# Patient Record
Sex: FEMALE | URBAN - METROPOLITAN AREA
[De-identification: names, ages, dates, MRNs, and addresses within clinical notes are randomized per-mention and may not be internally consistent; named-entity substitution may affect disease eponyms.]

---

## 2023-02-06 ENCOUNTER — OFFICE VISIT (OUTPATIENT)
Dept: URGENT CARE | Age: 28
End: 2023-02-06

## 2023-02-06 VITALS
SYSTOLIC BLOOD PRESSURE: 101 MMHG | DIASTOLIC BLOOD PRESSURE: 70 MMHG | TEMPERATURE: 99.9 F | BODY MASS INDEX: 22.31 KG/M2 | HEIGHT: 62 IN | RESPIRATION RATE: 17 BRPM | WEIGHT: 121.25 LBS | OXYGEN SATURATION: 97 % | HEART RATE: 110 BPM

## 2023-02-06 DIAGNOSIS — H66.91 ACUTE RIGHT OTITIS MEDIA: Primary | ICD-10-CM

## 2023-02-06 DIAGNOSIS — J02.0 ACUTE STREPTOCOCCAL PHARYNGITIS: ICD-10-CM

## 2023-02-06 LAB — STREPTOCOCCUS A RNA: POSITIVE

## 2023-02-06 RX ORDER — DEXTROMETHORPHAN POLISTIREX 30 MG/5ML
60 SUSPENSION ORAL 2 TIMES DAILY PRN
COMMUNITY
Start: 2023-02-06 | End: 2023-02-16

## 2023-02-06 RX ORDER — AMOXICILLIN 875 MG/1
875 TABLET, COATED ORAL 2 TIMES DAILY
Qty: 20 TABLET | Refills: 0 | Status: SHIPPED | OUTPATIENT
Start: 2023-02-06 | End: 2023-02-16

## 2023-02-06 ASSESSMENT — ENCOUNTER SYMPTOMS
SHORTNESS OF BREATH: 0
COUGH: 1
DIARRHEA: 0
VOMITING: 0
RHINORRHEA: 1
SORE THROAT: 1

## 2023-02-06 NOTE — PATIENT INSTRUCTIONS
Rapid strep positive in clinic today  Results reviewed with patient  Complete antibiotics as prescribed. Follow up in 7 days if symptoms persist or if symptoms worsen.

## 2023-02-06 NOTE — PROGRESS NOTES
Martin Laird (:  1995) is a 28 y.o. female,New patient, here for evaluation of the following chief complaint(s):  Pharyngitis and Cough (For about a month painful)      ASSESSMENT/PLAN:    ICD-10-CM    1. Acute right otitis media  H66.91 amoxicillin (AMOXIL) 875 MG tablet     dextromethorphan (DELSYM) 30 MG/5ML extended release liquid      2. Acute streptococcal pharyngitis  J02.0 POCT Rapid Strep A DNA (Alere i)          Rapid strep positive in clinic today  Results reviewed with patient  Complete antibiotics as prescribed. Follow up in 7 days if symptoms persist or if symptoms worsen. SUBJECTIVE/OBJECTIVE:    History provided by:  Patient   used: No    HPI:   28 y.o. female who is breastfeeding presents with symptoms of cough and sore throat ongoing for over one month. Feels feverish but has not checked temperature. Has taken Flonase and Robitussin for symptoms with mild relief. Vitals:    23 1205 23 1220   BP: 101/70    Pulse: (!) 117 (!) 110   Resp: 17    Temp: 99.9 °F (37.7 °C)    SpO2: 97%    Weight: 121 lb 4.1 oz (55 kg)    Height: 5' 2\" (1.575 m)        Review of Systems   Constitutional:  Positive for chills, diaphoresis and fatigue. HENT:  Positive for ear pain, rhinorrhea and sore throat. Negative for congestion. Respiratory:  Positive for cough (productive of white mucous). Negative for shortness of breath. Gastrointestinal:  Negative for diarrhea and vomiting. Skin:  Negative for rash. Neurological:  Positive for headaches. All other systems reviewed and are negative. Physical Exam  Vitals reviewed. Constitutional:       Appearance: Normal appearance. HENT:      Head: Normocephalic. Right Ear: Ear canal and external ear normal. Tympanic membrane is erythematous.       Left Ear: Tympanic membrane, ear canal and external ear normal.      Nose: Nose normal.      Mouth/Throat:      Mouth: Mucous membranes are moist. Pharynx: Uvula midline. Oropharyngeal exudate and posterior oropharyngeal erythema present. No uvula swelling. Tonsils: Tonsillar exudate present. No tonsillar abscesses. Eyes:      Pupils: Pupils are equal, round, and reactive to light. Cardiovascular:      Rate and Rhythm: Regular rhythm. Tachycardia present. Heart sounds: Normal heart sounds. Pulmonary:      Effort: Pulmonary effort is normal.   Musculoskeletal:      Cervical back: Normal range of motion. Neurological:      Mental Status: She is alert. Psychiatric:         Mood and Affect: Mood normal.         Behavior: Behavior normal.         An electronic signature was used to authenticate this note.     --Dasha Garces, NESTOR - CNP

## 2025-04-30 ENCOUNTER — OFFICE VISIT (OUTPATIENT)
Dept: PRIMARY CARE CLINIC | Age: 30
End: 2025-04-30
Payer: COMMERCIAL

## 2025-04-30 VITALS
DIASTOLIC BLOOD PRESSURE: 50 MMHG | RESPIRATION RATE: 16 BRPM | BODY MASS INDEX: 22.41 KG/M2 | TEMPERATURE: 96.8 F | HEART RATE: 70 BPM | OXYGEN SATURATION: 100 % | WEIGHT: 121.8 LBS | HEIGHT: 62 IN | SYSTOLIC BLOOD PRESSURE: 70 MMHG

## 2025-04-30 DIAGNOSIS — Z76.89 ENCOUNTER TO ESTABLISH CARE: Primary | ICD-10-CM

## 2025-04-30 DIAGNOSIS — D64.9 ANEMIA, UNSPECIFIED TYPE: ICD-10-CM

## 2025-04-30 DIAGNOSIS — R10.33 UMBILICAL PAIN: ICD-10-CM

## 2025-04-30 DIAGNOSIS — K21.9 GASTROESOPHAGEAL REFLUX DISEASE, UNSPECIFIED WHETHER ESOPHAGITIS PRESENT: ICD-10-CM

## 2025-04-30 DIAGNOSIS — Z97.5 IUD (INTRAUTERINE DEVICE) IN PLACE: ICD-10-CM

## 2025-04-30 DIAGNOSIS — R42 DIZZINESS: ICD-10-CM

## 2025-04-30 DIAGNOSIS — I95.0 IDIOPATHIC HYPOTENSION: ICD-10-CM

## 2025-04-30 DIAGNOSIS — Z90.49 S/P APPENDECTOMY: ICD-10-CM

## 2025-04-30 PROCEDURE — 99203 OFFICE O/P NEW LOW 30 MIN: CPT | Performed by: FAMILY MEDICINE

## 2025-04-30 PROCEDURE — 1036F TOBACCO NON-USER: CPT | Performed by: FAMILY MEDICINE

## 2025-04-30 PROCEDURE — G8420 CALC BMI NORM PARAMETERS: HCPCS | Performed by: FAMILY MEDICINE

## 2025-04-30 PROCEDURE — G8427 DOCREV CUR MEDS BY ELIG CLIN: HCPCS | Performed by: FAMILY MEDICINE

## 2025-04-30 RX ORDER — PANTOPRAZOLE SODIUM 20 MG/1
20 TABLET, DELAYED RELEASE ORAL
Qty: 30 TABLET | Refills: 2 | Status: SHIPPED | OUTPATIENT
Start: 2025-04-30

## 2025-04-30 SDOH — ECONOMIC STABILITY: FOOD INSECURITY: WITHIN THE PAST 12 MONTHS, THE FOOD YOU BOUGHT JUST DIDN'T LAST AND YOU DIDN'T HAVE MONEY TO GET MORE.: NEVER TRUE

## 2025-04-30 SDOH — ECONOMIC STABILITY: FOOD INSECURITY: WITHIN THE PAST 12 MONTHS, YOU WORRIED THAT YOUR FOOD WOULD RUN OUT BEFORE YOU GOT MONEY TO BUY MORE.: NEVER TRUE

## 2025-04-30 ASSESSMENT — PATIENT HEALTH QUESTIONNAIRE - PHQ9
2. FEELING DOWN, DEPRESSED OR HOPELESS: NOT AT ALL
SUM OF ALL RESPONSES TO PHQ QUESTIONS 1-9: 0
1. LITTLE INTEREST OR PLEASURE IN DOING THINGS: NOT AT ALL

## 2025-04-30 NOTE — PROGRESS NOTES
Shabnam Sanches (:  1995) is a 29 y.o. female,New patient, here for evaluation of the following chief complaint(s):  Abdominal Pain, Establish Care, Digestion problem, and Gastroesophageal Reflux      ASSESSMENT/PLAN:  1. Encounter to establish care  -     CBC with Auto Differential; Future  -     Comprehensive Metabolic Panel; Future  2. Idiopathic hypotension  Assessment & Plan:     3. Anemia, unspecified type  -     CBC with Auto Differential; Future  4. Umbilical pain  -     pantoprazole (PROTONIX) 20 MG tablet; Take 1 tablet by mouth every morning (before breakfast), Disp-30 tablet, R-2Normal  -     US ABDOMEN LIMITED; Future  -     Comprehensive Metabolic Panel; Future  5. Gastroesophageal reflux disease, unspecified whether esophagitis present  -     pantoprazole (PROTONIX) 20 MG tablet; Take 1 tablet by mouth every morning (before breakfast), Disp-30 tablet, R-2Normal  -     US ABDOMEN LIMITED; Future  -     Comprehensive Metabolic Panel; Future  6. Dizziness  7. IUD (intrauterine device) in place  Assessment & Plan:     8. S/P appendectomy  Assessment & Plan:         Return if symptoms worsen or fail to improve.    SUBJECTIVE/OBJECTIVE:  HPI    Pt here to establish care  [place]  on IPAD    Hypotension  - sometimes dizzy   - not regularly   - never been evaluated by Cards    Anemia  - thinks her dizziness might be from this  - not on any medications for this  - no BW for about 4 years      Abdominal Pain  - mid- pointing to the lower abdomen   - started about 1 week ago  - changed her diet - not eating in the morning, skips breakfast and sometimes dinner   - no appetite in the morning   - last BM yesterday - \"hard\"   - wants to check her GB as well  - normal period - has IUD  - no nausea, vomiting or diarrhe      GERD  - feels acid in her mouth   - has not tried any medications   - discussed medication for acid reflux - wants to try        Review of Systems    Physical

## 2025-05-02 ENCOUNTER — HOSPITAL ENCOUNTER (OUTPATIENT)
Age: 30
Discharge: HOME | End: 2025-05-02
Payer: COMMERCIAL

## 2025-05-02 DIAGNOSIS — R10.33 UMBILICAL PAIN: ICD-10-CM

## 2025-05-02 DIAGNOSIS — Z76.89 ENCOUNTER TO ESTABLISH CARE: ICD-10-CM

## 2025-05-02 DIAGNOSIS — K21.9 GASTROESOPHAGEAL REFLUX DISEASE, UNSPECIFIED WHETHER ESOPHAGITIS PRESENT: ICD-10-CM

## 2025-05-02 DIAGNOSIS — D64.9 ANEMIA, UNSPECIFIED TYPE: ICD-10-CM

## 2025-05-02 PROCEDURE — 76705 ECHO EXAM OF ABDOMEN: CPT

## 2025-05-03 LAB
ALBUMIN SERPL-MCNC: 4.4 G/DL (ref 3.4–5)
ALBUMIN/GLOB SERPL: 1.8 {RATIO} (ref 1.1–2.2)
ALP SERPL-CCNC: 27 U/L (ref 40–129)
ALT SERPL-CCNC: 14 U/L (ref 10–40)
ANION GAP SERPL CALCULATED.3IONS-SCNC: 8 MMOL/L (ref 3–16)
AST SERPL-CCNC: 17 U/L (ref 15–37)
BASOPHILS # BLD: 0 K/UL (ref 0–0.2)
BASOPHILS NFR BLD: 0.9 %
BILIRUB SERPL-MCNC: 0.6 MG/DL (ref 0–1)
BUN SERPL-MCNC: 14 MG/DL (ref 7–20)
CALCIUM SERPL-MCNC: 8.9 MG/DL (ref 8.3–10.6)
CHLORIDE SERPL-SCNC: 104 MMOL/L (ref 99–110)
CO2 SERPL-SCNC: 23 MMOL/L (ref 21–32)
CREAT SERPL-MCNC: 0.5 MG/DL (ref 0.6–1.1)
DEPRECATED RDW RBC AUTO: 17.5 % (ref 12.4–15.4)
EOSINOPHIL # BLD: 0.2 K/UL (ref 0–0.6)
EOSINOPHIL NFR BLD: 4.2 %
GFR SERPLBLD CREATININE-BSD FMLA CKD-EPI: >90 ML/MIN/{1.73_M2}
GLUCOSE SERPL-MCNC: 87 MG/DL (ref 70–99)
HCT VFR BLD AUTO: 27.7 % (ref 36–48)
HGB BLD-MCNC: 9 G/DL (ref 12–16)
LYMPHOCYTES # BLD: 1.8 K/UL (ref 1–5.1)
LYMPHOCYTES NFR BLD: 37.4 %
MCH RBC QN AUTO: 24.9 PG (ref 26–34)
MCHC RBC AUTO-ENTMCNC: 32.5 G/DL (ref 31–36)
MCV RBC AUTO: 76.7 FL (ref 80–100)
MONOCYTES # BLD: 0.2 K/UL (ref 0–1.3)
MONOCYTES NFR BLD: 4.7 %
NEUTROPHILS # BLD: 2.5 K/UL (ref 1.7–7.7)
NEUTROPHILS NFR BLD: 52.8 %
PLATELET # BLD AUTO: 291 K/UL (ref 135–450)
PMV BLD AUTO: 8.6 FL (ref 5–10.5)
POTASSIUM SERPL-SCNC: 4 MMOL/L (ref 3.5–5.1)
PROT SERPL-MCNC: 6.9 G/DL (ref 6.4–8.2)
RBC # BLD AUTO: 3.61 M/UL (ref 4–5.2)
SODIUM SERPL-SCNC: 135 MMOL/L (ref 136–145)
WBC # BLD AUTO: 4.8 K/UL (ref 4–11)

## 2025-05-05 ENCOUNTER — RESULTS FOLLOW-UP (OUTPATIENT)
Dept: PRIMARY CARE CLINIC | Age: 30
End: 2025-05-05

## 2025-05-05 DIAGNOSIS — D64.9 ANEMIA, UNSPECIFIED TYPE: Primary | ICD-10-CM

## 2025-05-05 RX ORDER — FERROUS SULFATE 325(65) MG
325 TABLET ORAL
Qty: 90 TABLET | Refills: 1 | Status: SHIPPED | OUTPATIENT
Start: 2025-05-05

## 2025-05-08 NOTE — TELEPHONE ENCOUNTER
Spoke to patient, with [place]  and advised of labs and US results. Patient verbalized understanding and answered all questions at time of call. Patient will continue on pantoprazole and start taking Iron supplements as prescribed. Patient wanted to let Dr. Penn know pantoprazole is helping with acid and not feeling well after patient eats.